# Patient Record
(demographics unavailable — no encounter records)

---

## 2017-05-04 NOTE — REP
RIGHT ANKLE, FIVE VIEWS:

 

There is no evidence of an acute fracture, dislocation or intrinsic bone disease.

The ankle mortise is anatomic.

 

IMPRESSION:

 

No fracture or dislocation.

 

 

Signed by

Tim James MD 05/05/2017 05:12 P

## 2017-08-22 NOTE — REP
OB ULTRASOUND:

 

Real-time sonographic evaluation of the gravid uterus is performed.

 

There is a single living intrauterine gestation.  The estimated gestational age

is 18 weeks 6 days based on LMP with EDC 01/17/2018. Today's measurements

indicate appropriate growth.

 

BPD 42 mm = 18 weeks 5 days, at the 50th percentile.

 

 mm = 18 weeks 6 days, at the 54th percentile.

 

 mm = 18 weeks 6 days, at the 52nd percentile.

 

Femur length 30 mm = 19 weeks 1 days, at the 58th percentile.

 

HC/AC ratio 1.20 within normal range.

 

Estimated fetal weight 267 grams at the 54th percentile. Fetal heart rate 150

beats per minute.

 

                                       SEEN/GROSSLY UNREMARKABLE

 

Lateral ventricles                       Yes

 

Posterior fossa                          Yes

 

Upper lip                                    Yes

 

Four-chamber heart                   Yes

 

LVOT                                         Yes

 

RVOT                                        Yes

 

Stomach                                    Yes

 

Cord insertion                            Yes

 

Three vessel cord                      Yes

 

Kidneys                                      Yes

 

Bladder                                      Yes

 

Spine                                         Yes

 

Fetal position:  Breach.

 

Placenta:  Posterior and grade 0 with no previa or abruption.

 

Amniotic fluid:   Within normal limits.

 

Cervix is closed and measures 3.7 cm in length.

 

 

Signed by

Tim James MD 08/22/2017 04:49 P

## 2017-12-18 NOTE — REP
Obstetric sonography:

 

History:  Size date discrepancy.  Third trimester study.

 

Findings:  Scanning through the gravid uterus demonstrates a viable single

intrauterine gestation in a cephalic fetal lie.  Fetal motion is observed and

heart rate is recorded at 140 beats per minute.  A posterior right lateral

placenta is seen grade II to III without evidence of previa.  Amniotic fluid is

subjectively normal.  No extrauterine abnormality is observed.  There has been

appropriate interval growth.

 

Fetal spine and upper extremities are less than optimally seen today due to fetal

position but these were visualized previously.  No fetal anomaly is seen.  Other

fetal anatomic structures are again visualized and felt to be unremarkable.

 

Biometry chart:

 

BPD 8.6 cm = 34-week 6 days

 

Head circumference 31.7 cm = 35 weeks 5 days

 

Abdominal circumference 29.2 cm = 33 weeks 2 days

 

Femur length 6.5 cm = 33 weeks 4 days

 

Humeral length 5.7 cm 33 weeks 0 days

 

HC/AC ratio normal 1.09

 

Cephalic index normal 0.8,

 

Estimated fetal weight 2263 grams 4 pounds 16 ounces 17th percentile for 35 weeks

5 days.

 

MARCEL normal 13.9 cm.

 

SD ratio in the umbilical cord artery by Doppler normal 2.72.

 

Impression:

 

Viable single intrauterine gestation at 34 weeks 0 days by today's composite

sonographic criteria.  Expected gestational age estimate based on prior

sonography is 35 weeks 4 days.  KAYLEY by prior sonography January 18, 2018.  There

has been appropriate interval growth.

 

 

Signed by

Everardo Walls MD 12/18/2017 09:38 P